# Patient Record
Sex: MALE | Race: WHITE | NOT HISPANIC OR LATINO | Employment: FULL TIME | URBAN - METROPOLITAN AREA
[De-identification: names, ages, dates, MRNs, and addresses within clinical notes are randomized per-mention and may not be internally consistent; named-entity substitution may affect disease eponyms.]

---

## 2022-01-07 PROCEDURE — U0004 COV-19 TEST NON-CDC HGH THRU: HCPCS | Performed by: FAMILY MEDICINE

## 2022-01-09 ENCOUNTER — TELEPHONE (OUTPATIENT)
Dept: URGENT CARE | Facility: CLINIC | Age: 47
End: 2022-01-09

## 2022-01-09 NOTE — TELEPHONE ENCOUNTER
----- Message from Hailey Nielsen, SAUL, APRN sent at 1/8/2022  5:34 PM EST -----  Notify these patients of positive covid and complete paperworks    ----- Message -----  From: Lab, Background User  Sent: 1/8/2022   5:11 PM EST  To:  [address] Covid Results